# Patient Record
Sex: MALE | Race: WHITE | NOT HISPANIC OR LATINO | Employment: STUDENT | URBAN - METROPOLITAN AREA
[De-identification: names, ages, dates, MRNs, and addresses within clinical notes are randomized per-mention and may not be internally consistent; named-entity substitution may affect disease eponyms.]

---
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
## 2020-06-01 ENCOUNTER — TELEPHONE (OUTPATIENT)
Dept: OPHTHALMOLOGY | Facility: CLINIC | Age: 20
End: 2020-06-01

## 2020-06-01 NOTE — TELEPHONE ENCOUNTER
Spoke with Johnny- patient complains of pain and redness of his left lower eyelid.  Johnny feels he needs to see Dr. William.  Explained to him that appointments are limited, but will make an exception to see Dr. William tomorrow around 2:45.  Johnny has an appointment at 3:15 tomorrow.  It may be difficult for Juan to get off from work, so they make exchange appointment times.

## 2020-06-01 NOTE — TELEPHONE ENCOUNTER
Pt's father called stated that he believes his son has an eye infection or something in his left eye. Stated that the lower eye lid is puffy, and red. Stated it is more red than the right eye.  Would like to discuss. Father stated he would like him to see Dr. William.

## 2021-05-30 ENCOUNTER — OFFICE VISIT (OUTPATIENT)
Dept: URGENT CARE | Facility: URGENT CARE | Age: 21
End: 2021-05-30
Payer: COMMERCIAL

## 2021-05-30 VITALS
OXYGEN SATURATION: 100 % | RESPIRATION RATE: 16 BRPM | BODY MASS INDEX: 23.65 KG/M2 | SYSTOLIC BLOOD PRESSURE: 115 MMHG | HEART RATE: 63 BPM | TEMPERATURE: 97.9 F | DIASTOLIC BLOOD PRESSURE: 74 MMHG | WEIGHT: 151 LBS

## 2021-05-30 DIAGNOSIS — S61.011A LACERATION OF RIGHT THUMB WITHOUT FOREIGN BODY WITHOUT DAMAGE TO NAIL, INITIAL ENCOUNTER: Primary | ICD-10-CM

## 2021-05-30 PROCEDURE — 90471 IMMUNIZATION ADMIN: CPT | Performed by: INTERNAL MEDICINE

## 2021-05-30 PROCEDURE — 12001 RPR S/N/AX/GEN/TRNK 2.5CM/<: CPT | Performed by: INTERNAL MEDICINE

## 2021-05-30 PROCEDURE — 90715 TDAP VACCINE 7 YRS/> IM: CPT | Performed by: INTERNAL MEDICINE

## 2021-05-30 NOTE — PROGRESS NOTES
Clinic Administered Medication Documentation      Injectable Medication Documentation    Patient was given Tdap. Prior to medication administration, verified patients identity using patient s name and date of birth. Please see MAR and medication order for additional information. Patient instructed to remain in clinic for 15 minutes.      Was entire vial of medication used? Yes  Vial/Syringe: Single dose vial  Expiration Date:  111/2022  Was this medication supplied by the patient? No     Anatoliy Brandon CMA on 5/30/2021 at 5:58 PM

## 2021-05-30 NOTE — PROGRESS NOTES
SUBJECTIVE:   20 year old male sustained laceration of right thumb 1 hours ago. Nature of injury: accidental cut with utility knife while attempting to remove a windshield decal. Tetanus vaccination status reviewed: last tetanus booster 10 years ago.     OBJECTIVE:   Patient appears well, vitals are normal. Laceration 1 cm noted.  Description: clean wound edges, no foreign bodies. Neurovascular and tendon structures are intact.    ASSESSMENT:   Laceration as described.    PLAN:   The wound edges are very clean and come together very well.  Hemostasis was achieved and the wound was closed and secured using Exofin skin adhesive.    Ramin Spring MD

## 2022-03-12 ENCOUNTER — HEALTH MAINTENANCE LETTER (OUTPATIENT)
Age: 22
End: 2022-03-12

## 2022-12-26 ENCOUNTER — HEALTH MAINTENANCE LETTER (OUTPATIENT)
Age: 22
End: 2022-12-26

## 2023-04-22 ENCOUNTER — HEALTH MAINTENANCE LETTER (OUTPATIENT)
Age: 23
End: 2023-04-22

## 2023-08-02 ASSESSMENT — ENCOUNTER SYMPTOMS
HEARTBURN: 0
DIZZINESS: 0
JOINT SWELLING: 0
MYALGIAS: 0
ABDOMINAL PAIN: 0
DIARRHEA: 0
CHILLS: 0
WEAKNESS: 0
HEMATURIA: 0
FEVER: 0
NERVOUS/ANXIOUS: 0
DYSURIA: 0
SORE THROAT: 0
HEMATOCHEZIA: 0
CONSTIPATION: 0
PARESTHESIAS: 0
COUGH: 0
HEADACHES: 0
EYE PAIN: 0
PALPITATIONS: 0
FREQUENCY: 0
ARTHRALGIAS: 0
SHORTNESS OF BREATH: 0
NAUSEA: 0

## 2023-08-03 ENCOUNTER — OFFICE VISIT (OUTPATIENT)
Dept: FAMILY MEDICINE | Facility: CLINIC | Age: 23
End: 2023-08-03
Payer: COMMERCIAL

## 2023-08-03 VITALS
HEART RATE: 85 BPM | BODY MASS INDEX: 24.25 KG/M2 | RESPIRATION RATE: 17 BRPM | TEMPERATURE: 97.8 F | OXYGEN SATURATION: 99 % | DIASTOLIC BLOOD PRESSURE: 76 MMHG | SYSTOLIC BLOOD PRESSURE: 126 MMHG | WEIGHT: 160 LBS | HEIGHT: 68 IN

## 2023-08-03 DIAGNOSIS — Z11.59 NEED FOR HEPATITIS C SCREENING TEST: ICD-10-CM

## 2023-08-03 DIAGNOSIS — Z13.6 CARDIOVASCULAR SCREENING; LDL GOAL LESS THAN 160: ICD-10-CM

## 2023-08-03 DIAGNOSIS — Z00.00 ROUTINE GENERAL MEDICAL EXAMINATION AT A HEALTH CARE FACILITY: Primary | ICD-10-CM

## 2023-08-03 DIAGNOSIS — Z13.1 SCREENING FOR DIABETES MELLITUS: ICD-10-CM

## 2023-08-03 DIAGNOSIS — Z11.4 SCREENING FOR HIV (HUMAN IMMUNODEFICIENCY VIRUS): ICD-10-CM

## 2023-08-03 LAB
CHOLEST SERPL-MCNC: 155 MG/DL
FASTING STATUS PATIENT QL REPORTED: NO
GLUCOSE SERPL-MCNC: 92 MG/DL (ref 70–99)
HCV AB SERPL QL IA: NONREACTIVE
HDLC SERPL-MCNC: 42 MG/DL
HIV 1+2 AB+HIV1 P24 AG SERPL QL IA: NONREACTIVE
LDLC SERPL CALC-MCNC: 94 MG/DL
NONHDLC SERPL-MCNC: 113 MG/DL
TRIGL SERPL-MCNC: 93 MG/DL

## 2023-08-03 PROCEDURE — 82947 ASSAY GLUCOSE BLOOD QUANT: CPT | Performed by: FAMILY MEDICINE

## 2023-08-03 PROCEDURE — 99395 PREV VISIT EST AGE 18-39: CPT | Performed by: FAMILY MEDICINE

## 2023-08-03 PROCEDURE — 80061 LIPID PANEL: CPT | Performed by: FAMILY MEDICINE

## 2023-08-03 PROCEDURE — 87389 HIV-1 AG W/HIV-1&-2 AB AG IA: CPT | Performed by: FAMILY MEDICINE

## 2023-08-03 PROCEDURE — 36415 COLL VENOUS BLD VENIPUNCTURE: CPT | Performed by: FAMILY MEDICINE

## 2023-08-03 PROCEDURE — 86803 HEPATITIS C AB TEST: CPT | Performed by: FAMILY MEDICINE

## 2023-08-03 ASSESSMENT — ENCOUNTER SYMPTOMS
HEARTBURN: 0
EYE PAIN: 0
ABDOMINAL PAIN: 0
FEVER: 0
MYALGIAS: 0
DIARRHEA: 0
JOINT SWELLING: 0
DYSURIA: 0
CONSTIPATION: 0
HEMATURIA: 0
NERVOUS/ANXIOUS: 0
SHORTNESS OF BREATH: 0
HEMATOCHEZIA: 0
FREQUENCY: 0
DIZZINESS: 0
NAUSEA: 0
PARESTHESIAS: 0
PALPITATIONS: 0
SORE THROAT: 0
WEAKNESS: 0
CHILLS: 0
COUGH: 0
ARTHRALGIAS: 0
HEADACHES: 0

## 2023-08-03 NOTE — RESULT ENCOUNTER NOTE
Hello!  It was a pleasure to see you in clinic!  Thank you for getting labs done.     Everything looks normal, which is good news.     Your glucose was normal, which is great, you do not have diabetes.    Your cholesterol is fantastic!  Your total cholesterol is low, and your HDL, or good cholesterol, is very high, which is great, as this protects your heart from heart disease and shows that you get a good amount of exercise.      Your triglycerides are also very low.  Triglycerides are increased by eating lots of sugar, including juice, excess fruit, bread, pasta, rice and cereal, so you must not eat a lot of these things (or you have good genes!)      If you have any questions, please contact the clinic or schedule an appointment with me, thank you!    Sincerely,    Wendy Dobbs MD   8/3/2023

## 2023-08-03 NOTE — PROGRESS NOTES
SUBJECTIVE:   CC: Juan is an 23 year old who presents for preventive health visit.   He works as a  at [employer].  Graduated from [employer] 2022.        8/3/2023     1:39 PM   Additional Questions   Roomed by Karis   Accompanied by Self     Healthy Habits:     Getting at least 3 servings of Calcium per day:  Yes    Bi-annual eye exam:  NO    Dental care twice a year:  Yes    Sleep apnea or symptoms of sleep apnea:  None    Diet:  Regular (no restrictions)    Frequency of exercise:  4-5 days/week    Duration of exercise:  45-60 minutes    Taking medications regularly:  Yes    Medication side effects:  None    Additional concerns today:  No    Today's PHQ-2 Score:       8/2/2023     2:50 PM   PHQ-2 ( 1999 [employer])   Q1: Little interest or pleasure in doing things 0   Q2: Feeling down, depressed or hopeless 0   PHQ-2 Score 0   Q1: Little interest or pleasure in doing things Not at all   Q2: Feeling down, depressed or hopeless Not at all   PHQ-2 Score 0       Have you ever done Advance Care Planning? (For example, a Health Directive, POLST, or a discussion with a medical provider or your loved ones about your wishes): No, advance care planning information given to patient to review.  Patient declined advance care planning discussion at this time.    Social History     Tobacco Use    Smoking status: Never    Smokeless tobacco: Never   Substance Use Topics    Alcohol use: No     Alcohol/week: 0.0 standard drinks of alcohol             8/2/2023     2:49 PM   Alcohol Use   Prescreen: >3 drinks/day or >7 drinks/week? No     Reviewed orders with patient.  Reviewed health maintenance and updated orders accordingly - Yes  BP Readings from Last 3 Encounters:   08/03/23 126/76   05/30/21 115/74   08/12/19 128/78    Wt Readings from Last 3 Encounters:   08/03/23 72.6 kg (160 lb)   05/30/21 68.5 kg (151 lb)   08/27/18 72.1 kg (159 lb) (66 %, Z= 0.41)*     * Growth  percentiles are based on CDC (Boys, 2-20 Years) data.                  Patient Active Problem List   Diagnosis   (none) - all problems resolved or deleted     Past Surgical History:   Procedure Laterality Date    WISDOM TOOTH EXTRACTION Bilateral 2019       Social History     Tobacco Use    Smoking status: Never    Smokeless tobacco: Never   Substance Use Topics    Alcohol use: No     Alcohol/week: 0.0 standard drinks of alcohol     Family History   Problem Relation Age of Onset    Diabetes Type 2  Father     Coronary Artery Disease Maternal Grandmother     Diabetes Type 2  Paternal Grandfather     Depression Paternal Uncle          Current Outpatient Medications   Medication Sig Dispense Refill    clindamycin-benzoyl peroxide (BENZACLIN) 1-5 % external gel Apply topically every morning (Patient not taking: Reported on 8/3/2023) 50 g 11     No Known Allergies  No lab results found.     Breast Cancer Screening:  Reviewed and updated as needed this visit by clinical staff   Tobacco  Allergies  Meds    Surg Hx  Fam Hx  Soc Hx        Reviewed and updated as needed this visit by Provider   Tobacco      Surg Hx  Fam Hx  Soc Hx       Past Medical History:   Diagnosis Date    Known health problems: none       Past Surgical History:   Procedure Laterality Date    WISDOM TOOTH EXTRACTION Bilateral 2019       Review of Systems   Constitutional:  Negative for chills and fever.   HENT:  Negative for congestion, ear pain, hearing loss and sore throat.    Eyes:  Negative for pain and visual disturbance.   Respiratory:  Negative for cough and shortness of breath.    Cardiovascular:  Negative for chest pain, palpitations and peripheral edema.   Gastrointestinal:  Negative for abdominal pain, constipation, diarrhea, heartburn, hematochezia and nausea.   Genitourinary:  Negative for dysuria, frequency, genital sores, hematuria, impotence, penile discharge and urgency.   Musculoskeletal:  Negative for arthralgias, joint  "swelling and myalgias.   Skin:  Negative for rash.   Neurological:  Negative for dizziness, weakness, headaches and paresthesias.   Psychiatric/Behavioral:  Negative for mood changes. The patient is not nervous/anxious.       OBJECTIVE:   /76 (BP Location: Right arm, Patient Position: Sitting, Cuff Size: Adult Regular)   Pulse 85   Temp 97.8  F (36.6  C) (Temporal)   Resp 17   Ht 1.72 m (5' 7.72\")   Wt 72.6 kg (160 lb)   SpO2 99%   BMI 24.53 kg/m    Physical Exam  GENERAL: healthy, alert and no distress  EYES: Eyes grossly normal to inspection, PERRL and conjunctivae and sclerae normal  HENT: ear canals and TM's normal, nose and mouth without ulcers or lesions  NECK: no adenopathy, no asymmetry, masses, or scars and thyroid normal to palpation  RESP: lungs clear to auscultation - no rales, rhonchi or wheezes  CV: regular rate and rhythm, normal S1 S2, no S3 or S4, no murmur, click or rub, no peripheral edema and peripheral pulses strong  ABDOMEN: soft, nontender, no hepatosplenomegaly, no masses and bowel sounds normal  MS: no gross musculoskeletal defects noted, no edema  SKIN: no suspicious lesions or rashes  NEURO: Normal strength and tone, mentation intact and speech normal  PSYCH: mentation appears normal, affect normal/bright    ASSESSMENT/PLAN:   ASSESSMENT / PLAN:  (Z00.00) Routine general medical examination at a health care facility  (primary encounter diagnosis)  Healthy 23 year old male, no health concerns.    Routine screening:    (Z11.4) Screening for HIV (human immunodeficiency virus)  (Z11.59) Need for hepatitis C screening test  (Z13.6) CARDIOVASCULAR SCREENING; LDL GOAL LESS THAN 160  (Z13.1) Screening for diabetes mellitus    COUNSELING:  Reviewed preventive health counseling, as reflected in patient instructions       Advised testicular self exam        He reports that he has never smoked. He has never used smokeless tobacco.          Wendy Dobbs MD  [facility] " [facility]

## 2023-08-04 NOTE — RESULT ENCOUNTER NOTE
The rest of your labs are back and everything looks great!  Your hiv test was negative for infection, you do NOT have this disease.   Your screening test was negative for Hepatitis C, which is great news! You have NOT been infected with this liver disease.  You do not need any further testing for Hepatitis C.     If you have any questions, please contact the clinic or schedule an appointment with me, thank you!      Sincerely,  Dr. Wendy Dobbs MD  8/4/2023

## 2024-07-05 ENCOUNTER — PATIENT OUTREACH (OUTPATIENT)
Dept: CARE COORDINATION | Facility: CLINIC | Age: 24
End: 2024-07-05
Payer: COMMERCIAL

## 2024-07-19 ENCOUNTER — PATIENT OUTREACH (OUTPATIENT)
Dept: CARE COORDINATION | Facility: CLINIC | Age: 24
End: 2024-07-19
Payer: COMMERCIAL

## 2024-11-10 ENCOUNTER — HEALTH MAINTENANCE LETTER (OUTPATIENT)
Age: 24
End: 2024-11-10